# Patient Record
Sex: MALE | Race: BLACK OR AFRICAN AMERICAN | Employment: FULL TIME | URBAN - METROPOLITAN AREA
[De-identification: names, ages, dates, MRNs, and addresses within clinical notes are randomized per-mention and may not be internally consistent; named-entity substitution may affect disease eponyms.]

---

## 2023-11-30 NOTE — PERIOP NOTE
Requested recent bloodwork,  EKG graph, and last office note from the patient's PCP office. Notified patient that he needs to come in for the MRSA culture. Pt stated he would come in tomorrow.

## 2023-12-01 ENCOUNTER — HOSPITAL ENCOUNTER (OUTPATIENT)
Facility: HOSPITAL | Age: 58
Discharge: HOME | End: 2023-12-04

## 2023-12-01 DIAGNOSIS — Z01.818 PRE-OP TESTING: ICD-10-CM

## 2023-12-02 LAB
BACTERIA SPEC CULT: NORMAL
BACTERIA SPEC CULT: NORMAL
SERVICE CMNT-IMP: NORMAL

## 2023-12-05 ENCOUNTER — ANESTHESIA EVENT (OUTPATIENT)
Facility: HOSPITAL | Age: 58
End: 2023-12-05
Payer: COMMERCIAL
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
## 2023-12-06 ENCOUNTER — HOSPITAL ENCOUNTER (OUTPATIENT)
Facility: HOSPITAL | Age: 58
Setting detail: OBSERVATION
Discharge: HOME | End: 2023-12-07
Attending: ORTHOPAEDIC SURGERY | Admitting: ORTHOPAEDIC SURGERY
Payer: COMMERCIAL

## 2023-12-06 ENCOUNTER — ANESTHESIA (OUTPATIENT)
Facility: HOSPITAL | Age: 58
End: 2023-12-06
Payer: COMMERCIAL

## 2023-12-06 ENCOUNTER — APPOINTMENT (OUTPATIENT)
Facility: HOSPITAL | Age: 58
End: 2023-12-06
Attending: ORTHOPAEDIC SURGERY
Payer: COMMERCIAL

## 2023-12-06 DIAGNOSIS — M48.02 CERVICAL STENOSIS OF SPINAL CANAL: ICD-10-CM

## 2023-12-06 DIAGNOSIS — Z01.818 PRE-OP TESTING: Primary | ICD-10-CM

## 2023-12-06 LAB
ABO + RH BLD: NORMAL
BLOOD GROUP ANTIBODIES SERPL: NORMAL
SPECIMEN EXP DATE BLD: NORMAL

## 2023-12-06 PROCEDURE — C1713 ANCHOR/SCREW BN/BN,TIS/BN: HCPCS | Performed by: ORTHOPAEDIC SURGERY

## 2023-12-06 PROCEDURE — 36415 COLL VENOUS BLD VENIPUNCTURE: CPT

## 2023-12-06 PROCEDURE — 6360000002 HC RX W HCPCS: Performed by: ORTHOPAEDIC SURGERY

## 2023-12-06 PROCEDURE — 2500000003 HC RX 250 WO HCPCS: Performed by: NURSE ANESTHETIST, CERTIFIED REGISTERED

## 2023-12-06 PROCEDURE — C1729 CATH, DRAINAGE: HCPCS | Performed by: ORTHOPAEDIC SURGERY

## 2023-12-06 PROCEDURE — 3600000002 HC SURGERY LEVEL 2 BASE: Performed by: ORTHOPAEDIC SURGERY

## 2023-12-06 PROCEDURE — 86901 BLOOD TYPING SEROLOGIC RH(D): CPT

## 2023-12-06 PROCEDURE — 3700000001 HC ADD 15 MINUTES (ANESTHESIA): Performed by: ORTHOPAEDIC SURGERY

## 2023-12-06 PROCEDURE — A4217 STERILE WATER/SALINE, 500 ML: HCPCS | Performed by: ORTHOPAEDIC SURGERY

## 2023-12-06 PROCEDURE — 6370000000 HC RX 637 (ALT 250 FOR IP): Performed by: ORTHOPAEDIC SURGERY

## 2023-12-06 PROCEDURE — 7100000000 HC PACU RECOVERY - FIRST 15 MIN: Performed by: ORTHOPAEDIC SURGERY

## 2023-12-06 PROCEDURE — 6360000002 HC RX W HCPCS: Performed by: SPECIALIST

## 2023-12-06 PROCEDURE — 2580000003 HC RX 258: Performed by: ORTHOPAEDIC SURGERY

## 2023-12-06 PROCEDURE — 2709999900 HC NON-CHARGEABLE SUPPLY: Performed by: ORTHOPAEDIC SURGERY

## 2023-12-06 PROCEDURE — 7100000001 HC PACU RECOVERY - ADDTL 15 MIN: Performed by: ORTHOPAEDIC SURGERY

## 2023-12-06 PROCEDURE — 86900 BLOOD TYPING SEROLOGIC ABO: CPT

## 2023-12-06 PROCEDURE — G0378 HOSPITAL OBSERVATION PER HR: HCPCS

## 2023-12-06 PROCEDURE — 2500000003 HC RX 250 WO HCPCS: Performed by: ORTHOPAEDIC SURGERY

## 2023-12-06 PROCEDURE — L0120 CERV FLEX N/ADJ FOAM PRE OTS: HCPCS | Performed by: ORTHOPAEDIC SURGERY

## 2023-12-06 PROCEDURE — C1889 IMPLANT/INSERT DEVICE, NOC: HCPCS | Performed by: ORTHOPAEDIC SURGERY

## 2023-12-06 PROCEDURE — 86850 RBC ANTIBODY SCREEN: CPT

## 2023-12-06 PROCEDURE — 3700000000 HC ANESTHESIA ATTENDED CARE: Performed by: ORTHOPAEDIC SURGERY

## 2023-12-06 PROCEDURE — 77002 NEEDLE LOCALIZATION BY XRAY: CPT

## 2023-12-06 PROCEDURE — 3600000012 HC SURGERY LEVEL 2 ADDTL 15MIN: Performed by: ORTHOPAEDIC SURGERY

## 2023-12-06 PROCEDURE — 6360000002 HC RX W HCPCS: Performed by: NURSE ANESTHETIST, CERTIFIED REGISTERED

## 2023-12-06 PROCEDURE — 2720000010 HC SURG SUPPLY STERILE: Performed by: ORTHOPAEDIC SURGERY

## 2023-12-06 DEVICE — IMPLANTABLE DEVICE: Type: IMPLANTABLE DEVICE | Site: SPINE CERVICAL | Status: FUNCTIONAL

## 2023-12-06 DEVICE — SCREW SPNL SELF-STARTING 4X16 MM VA NS OZARK: Type: IMPLANTABLE DEVICE | Site: SPINE CERVICAL | Status: FUNCTIONAL

## 2023-12-06 RX ORDER — ONDANSETRON 4 MG/1
4 TABLET, ORALLY DISINTEGRATING ORAL EVERY 8 HOURS PRN
Status: DISCONTINUED | OUTPATIENT
Start: 2023-12-06 | End: 2023-12-07 | Stop reason: HOSPADM

## 2023-12-06 RX ORDER — FENTANYL CITRATE 50 UG/ML
INJECTION, SOLUTION INTRAMUSCULAR; INTRAVENOUS PRN
Status: DISCONTINUED | OUTPATIENT
Start: 2023-12-06 | End: 2023-12-06 | Stop reason: SDUPTHER

## 2023-12-06 RX ORDER — HYDROMORPHONE HYDROCHLORIDE 1 MG/ML
0.25 INJECTION, SOLUTION INTRAMUSCULAR; INTRAVENOUS; SUBCUTANEOUS EVERY 5 MIN PRN
Status: DISCONTINUED | OUTPATIENT
Start: 2023-12-06 | End: 2023-12-06 | Stop reason: HOSPADM

## 2023-12-06 RX ORDER — VANCOMYCIN HYDROCHLORIDE 1 G/20ML
INJECTION, POWDER, LYOPHILIZED, FOR SOLUTION INTRAVENOUS PRN
Status: DISCONTINUED | OUTPATIENT
Start: 2023-12-06 | End: 2023-12-06 | Stop reason: ALTCHOICE

## 2023-12-06 RX ORDER — FENTANYL CITRATE 50 UG/ML
25 INJECTION, SOLUTION INTRAMUSCULAR; INTRAVENOUS EVERY 5 MIN PRN
Status: DISCONTINUED | OUTPATIENT
Start: 2023-12-06 | End: 2023-12-06 | Stop reason: HOSPADM

## 2023-12-06 RX ORDER — FAMOTIDINE 20 MG/1
20 TABLET, FILM COATED ORAL 2 TIMES DAILY
Status: DISCONTINUED | OUTPATIENT
Start: 2023-12-06 | End: 2023-12-07 | Stop reason: HOSPADM

## 2023-12-06 RX ORDER — LABETALOL HYDROCHLORIDE 5 MG/ML
10 INJECTION, SOLUTION INTRAVENOUS
Status: DISCONTINUED | OUTPATIENT
Start: 2023-12-06 | End: 2023-12-06 | Stop reason: HOSPADM

## 2023-12-06 RX ORDER — DIPHENHYDRAMINE HYDROCHLORIDE 50 MG/ML
12.5 INJECTION INTRAMUSCULAR; INTRAVENOUS
Status: DISCONTINUED | OUTPATIENT
Start: 2023-12-06 | End: 2023-12-06 | Stop reason: HOSPADM

## 2023-12-06 RX ORDER — SODIUM CHLORIDE 0.9 % (FLUSH) 0.9 %
5-40 SYRINGE (ML) INJECTION PRN
Status: DISCONTINUED | OUTPATIENT
Start: 2023-12-06 | End: 2023-12-06 | Stop reason: HOSPADM

## 2023-12-06 RX ORDER — DULOXETIN HYDROCHLORIDE 30 MG/1
30 CAPSULE, DELAYED RELEASE ORAL EVERY EVENING
Status: DISCONTINUED | OUTPATIENT
Start: 2023-12-06 | End: 2023-12-07 | Stop reason: HOSPADM

## 2023-12-06 RX ORDER — MIDAZOLAM HYDROCHLORIDE 1 MG/ML
INJECTION INTRAMUSCULAR; INTRAVENOUS PRN
Status: DISCONTINUED | OUTPATIENT
Start: 2023-12-06 | End: 2023-12-06 | Stop reason: SDUPTHER

## 2023-12-06 RX ORDER — SODIUM CHLORIDE 0.9 % (FLUSH) 0.9 %
5-40 SYRINGE (ML) INJECTION EVERY 12 HOURS SCHEDULED
Status: DISCONTINUED | OUTPATIENT
Start: 2023-12-06 | End: 2023-12-06 | Stop reason: HOSPADM

## 2023-12-06 RX ORDER — PROPOFOL 10 MG/ML
INJECTION, EMULSION INTRAVENOUS PRN
Status: DISCONTINUED | OUTPATIENT
Start: 2023-12-06 | End: 2023-12-06 | Stop reason: SDUPTHER

## 2023-12-06 RX ORDER — ACETAMINOPHEN 325 MG/1
650 TABLET ORAL EVERY 6 HOURS
Status: DISCONTINUED | OUTPATIENT
Start: 2023-12-06 | End: 2023-12-07 | Stop reason: HOSPADM

## 2023-12-06 RX ORDER — SODIUM CHLORIDE 0.9 % (FLUSH) 0.9 %
5-40 SYRINGE (ML) INJECTION EVERY 12 HOURS SCHEDULED
Status: DISCONTINUED | OUTPATIENT
Start: 2023-12-06 | End: 2023-12-07 | Stop reason: HOSPADM

## 2023-12-06 RX ORDER — LIDOCAINE HCL/PF 100 MG/5ML
SYRINGE (ML) INJECTION PRN
Status: DISCONTINUED | OUTPATIENT
Start: 2023-12-06 | End: 2023-12-06 | Stop reason: SDUPTHER

## 2023-12-06 RX ORDER — METAXALONE 800 MG/1
800 TABLET ORAL EVERY 8 HOURS PRN
Status: DISCONTINUED | OUTPATIENT
Start: 2023-12-06 | End: 2023-12-07 | Stop reason: HOSPADM

## 2023-12-06 RX ORDER — SODIUM CHLORIDE, SODIUM LACTATE, POTASSIUM CHLORIDE, CALCIUM CHLORIDE 600; 310; 30; 20 MG/100ML; MG/100ML; MG/100ML; MG/100ML
INJECTION, SOLUTION INTRAVENOUS CONTINUOUS
Status: DISCONTINUED | OUTPATIENT
Start: 2023-12-06 | End: 2023-12-07 | Stop reason: HOSPADM

## 2023-12-06 RX ORDER — HYDROMORPHONE HYDROCHLORIDE 1 MG/ML
0.5 INJECTION, SOLUTION INTRAMUSCULAR; INTRAVENOUS; SUBCUTANEOUS
Status: DISCONTINUED | OUTPATIENT
Start: 2023-12-06 | End: 2023-12-07 | Stop reason: HOSPADM

## 2023-12-06 RX ORDER — SODIUM CHLORIDE 450 MG/100ML
INJECTION, SOLUTION INTRAVENOUS CONTINUOUS
Status: DISCONTINUED | OUTPATIENT
Start: 2023-12-06 | End: 2023-12-07 | Stop reason: HOSPADM

## 2023-12-06 RX ORDER — AMLODIPINE BESYLATE 5 MG/1
5 TABLET ORAL DAILY
Status: DISCONTINUED | OUTPATIENT
Start: 2023-12-06 | End: 2023-12-07 | Stop reason: HOSPADM

## 2023-12-06 RX ORDER — ATORVASTATIN CALCIUM 10 MG/1
10 TABLET, FILM COATED ORAL NIGHTLY
Status: DISCONTINUED | OUTPATIENT
Start: 2023-12-06 | End: 2023-12-07 | Stop reason: HOSPADM

## 2023-12-06 RX ORDER — SODIUM CHLORIDE 9 MG/ML
INJECTION, SOLUTION INTRAVENOUS PRN
Status: DISCONTINUED | OUTPATIENT
Start: 2023-12-06 | End: 2023-12-06 | Stop reason: HOSPADM

## 2023-12-06 RX ORDER — POLYETHYLENE GLYCOL 3350 17 G/17G
17 POWDER, FOR SOLUTION ORAL DAILY
Status: DISCONTINUED | OUTPATIENT
Start: 2023-12-06 | End: 2023-12-07 | Stop reason: HOSPADM

## 2023-12-06 RX ORDER — HYDROMORPHONE HYDROCHLORIDE 2 MG/1
2 TABLET ORAL EVERY 4 HOURS PRN
Status: DISCONTINUED | OUTPATIENT
Start: 2023-12-06 | End: 2023-12-07 | Stop reason: HOSPADM

## 2023-12-06 RX ORDER — GLYCOPYRROLATE 0.2 MG/ML
INJECTION INTRAMUSCULAR; INTRAVENOUS PRN
Status: DISCONTINUED | OUTPATIENT
Start: 2023-12-06 | End: 2023-12-06 | Stop reason: SDUPTHER

## 2023-12-06 RX ORDER — ONDANSETRON 2 MG/ML
INJECTION INTRAMUSCULAR; INTRAVENOUS PRN
Status: DISCONTINUED | OUTPATIENT
Start: 2023-12-06 | End: 2023-12-06 | Stop reason: SDUPTHER

## 2023-12-06 RX ORDER — ONDANSETRON 2 MG/ML
4 INJECTION INTRAMUSCULAR; INTRAVENOUS
Status: DISCONTINUED | OUTPATIENT
Start: 2023-12-06 | End: 2023-12-06 | Stop reason: HOSPADM

## 2023-12-06 RX ORDER — INDOCYANINE GREEN AND WATER 25 MG
KIT INJECTION PRN
Status: DISCONTINUED | OUTPATIENT
Start: 2023-12-06 | End: 2023-12-06 | Stop reason: ALTCHOICE

## 2023-12-06 RX ORDER — ROCURONIUM BROMIDE 10 MG/ML
INJECTION, SOLUTION INTRAVENOUS PRN
Status: DISCONTINUED | OUTPATIENT
Start: 2023-12-06 | End: 2023-12-06 | Stop reason: SDUPTHER

## 2023-12-06 RX ORDER — BISACODYL 5 MG/1
5 TABLET, DELAYED RELEASE ORAL DAILY
Status: DISCONTINUED | OUTPATIENT
Start: 2023-12-06 | End: 2023-12-07 | Stop reason: HOSPADM

## 2023-12-06 RX ORDER — HYDROMORPHONE HYDROCHLORIDE 1 MG/ML
0.25 INJECTION, SOLUTION INTRAMUSCULAR; INTRAVENOUS; SUBCUTANEOUS
Status: DISCONTINUED | OUTPATIENT
Start: 2023-12-06 | End: 2023-12-07 | Stop reason: HOSPADM

## 2023-12-06 RX ORDER — HYDROMORPHONE HYDROCHLORIDE 1 MG/ML
INJECTION, SOLUTION INTRAMUSCULAR; INTRAVENOUS; SUBCUTANEOUS PRN
Status: DISCONTINUED | OUTPATIENT
Start: 2023-12-06 | End: 2023-12-06 | Stop reason: SDUPTHER

## 2023-12-06 RX ORDER — DIPHENHYDRAMINE HCL 25 MG
25 CAPSULE ORAL EVERY 6 HOURS PRN
Status: DISCONTINUED | OUTPATIENT
Start: 2023-12-06 | End: 2023-12-07 | Stop reason: HOSPADM

## 2023-12-06 RX ORDER — DIPHENHYDRAMINE HYDROCHLORIDE 50 MG/ML
25 INJECTION INTRAMUSCULAR; INTRAVENOUS EVERY 6 HOURS PRN
Status: DISCONTINUED | OUTPATIENT
Start: 2023-12-06 | End: 2023-12-07 | Stop reason: HOSPADM

## 2023-12-06 RX ORDER — ONDANSETRON 2 MG/ML
4 INJECTION INTRAMUSCULAR; INTRAVENOUS EVERY 6 HOURS PRN
Status: DISCONTINUED | OUTPATIENT
Start: 2023-12-06 | End: 2023-12-07 | Stop reason: HOSPADM

## 2023-12-06 RX ORDER — HYDROMORPHONE HYDROCHLORIDE 4 MG/1
4 TABLET ORAL EVERY 4 HOURS PRN
Status: DISCONTINUED | OUTPATIENT
Start: 2023-12-06 | End: 2023-12-07 | Stop reason: HOSPADM

## 2023-12-06 RX ORDER — DROPERIDOL 2.5 MG/ML
0.62 INJECTION, SOLUTION INTRAMUSCULAR; INTRAVENOUS
Status: DISCONTINUED | OUTPATIENT
Start: 2023-12-06 | End: 2023-12-06 | Stop reason: HOSPADM

## 2023-12-06 RX ORDER — FENTANYL CITRATE 50 UG/ML
25 INJECTION, SOLUTION INTRAMUSCULAR; INTRAVENOUS
Status: COMPLETED | OUTPATIENT
Start: 2023-12-06 | End: 2023-12-06

## 2023-12-06 RX ADMIN — FAMOTIDINE 20 MG: 20 TABLET, FILM COATED ORAL at 19:51

## 2023-12-06 RX ADMIN — HYDROMORPHONE HYDROCHLORIDE 1 MG: 1 INJECTION, SOLUTION INTRAMUSCULAR; INTRAVENOUS; SUBCUTANEOUS at 15:52

## 2023-12-06 RX ADMIN — WATER 2000 MG: 1 INJECTION INTRAMUSCULAR; INTRAVENOUS; SUBCUTANEOUS at 15:40

## 2023-12-06 RX ADMIN — ROCURONIUM BROMIDE 10 MG: 10 INJECTION, SOLUTION INTRAVENOUS at 16:33

## 2023-12-06 RX ADMIN — SODIUM CHLORIDE, SODIUM LACTATE, POTASSIUM CHLORIDE, AND CALCIUM CHLORIDE: 600; 310; 30; 20 INJECTION, SOLUTION INTRAVENOUS at 15:27

## 2023-12-06 RX ADMIN — PROPOFOL 150 MG: 10 INJECTION, EMULSION INTRAVENOUS at 15:28

## 2023-12-06 RX ADMIN — MIDAZOLAM 2 MG: 1 INJECTION INTRAMUSCULAR; INTRAVENOUS at 15:21

## 2023-12-06 RX ADMIN — Medication 60 MG: at 15:27

## 2023-12-06 RX ADMIN — BISACODYL 5 MG: 5 TABLET, COATED ORAL at 20:44

## 2023-12-06 RX ADMIN — SUGAMMADEX 200 MG: 100 INJECTION, SOLUTION INTRAVENOUS at 17:55

## 2023-12-06 RX ADMIN — FENTANYL CITRATE 100 MCG: 50 INJECTION, SOLUTION INTRAMUSCULAR; INTRAVENOUS at 15:27

## 2023-12-06 RX ADMIN — DULOXETINE HYDROCHLORIDE 30 MG: 30 CAPSULE, DELAYED RELEASE ORAL at 20:14

## 2023-12-06 RX ADMIN — SODIUM CHLORIDE: 4.5 INJECTION, SOLUTION INTRAVENOUS at 19:45

## 2023-12-06 RX ADMIN — ATORVASTATIN CALCIUM 10 MG: 10 TABLET, FILM COATED ORAL at 19:51

## 2023-12-06 RX ADMIN — ONDANSETRON 4 MG: 2 INJECTION INTRAMUSCULAR; INTRAVENOUS at 15:23

## 2023-12-06 RX ADMIN — ROCURONIUM BROMIDE 50 MG: 10 INJECTION, SOLUTION INTRAVENOUS at 15:29

## 2023-12-06 RX ADMIN — FENTANYL CITRATE 25 MCG: 50 INJECTION INTRAMUSCULAR; INTRAVENOUS at 13:53

## 2023-12-06 RX ADMIN — ROCURONIUM BROMIDE 10 MG: 10 INJECTION, SOLUTION INTRAVENOUS at 17:14

## 2023-12-06 RX ADMIN — HYDROMORPHONE HYDROCHLORIDE 0.5 MG: 1 INJECTION, SOLUTION INTRAMUSCULAR; INTRAVENOUS; SUBCUTANEOUS at 19:50

## 2023-12-06 RX ADMIN — AMLODIPINE BESYLATE 5 MG: 5 TABLET ORAL at 20:44

## 2023-12-06 RX ADMIN — FENTANYL CITRATE 25 MCG: 50 INJECTION INTRAMUSCULAR; INTRAVENOUS at 13:28

## 2023-12-06 RX ADMIN — GLYCOPYRROLATE 0.2 MG: 0.2 INJECTION INTRAMUSCULAR; INTRAVENOUS at 15:23

## 2023-12-06 RX ADMIN — ACETAMINOPHEN 650 MG: 325 TABLET ORAL at 20:14

## 2023-12-06 RX ADMIN — SODIUM CHLORIDE, SODIUM LACTATE, POTASSIUM CHLORIDE, AND CALCIUM CHLORIDE: 600; 310; 30; 20 INJECTION, SOLUTION INTRAVENOUS at 09:11

## 2023-12-06 RX ADMIN — FENTANYL CITRATE 25 MCG: 50 INJECTION INTRAMUSCULAR; INTRAVENOUS at 13:41

## 2023-12-06 RX ADMIN — FENTANYL CITRATE 25 MCG: 50 INJECTION INTRAMUSCULAR; INTRAVENOUS at 14:08

## 2023-12-06 ASSESSMENT — PAIN DESCRIPTION - DESCRIPTORS
DESCRIPTORS: ACHING
DESCRIPTORS: ACHING
DESCRIPTORS: ACHING;BURNING
DESCRIPTORS: ACHING

## 2023-12-06 ASSESSMENT — PAIN SCALES - GENERAL
PAINLEVEL_OUTOF10: 0
PAINLEVEL_OUTOF10: 8
PAINLEVEL_OUTOF10: 10
PAINLEVEL_OUTOF10: 0
PAINLEVEL_OUTOF10: 0
PAINLEVEL_OUTOF10: 10
PAINLEVEL_OUTOF10: 10

## 2023-12-06 ASSESSMENT — PAIN DESCRIPTION - LOCATION
LOCATION: NECK
LOCATION: GENERALIZED
LOCATION: NECK

## 2023-12-06 ASSESSMENT — PAIN DESCRIPTION - ORIENTATION
ORIENTATION: ANTERIOR

## 2023-12-06 ASSESSMENT — PAIN - FUNCTIONAL ASSESSMENT
PAIN_FUNCTIONAL_ASSESSMENT: 0-10
PAIN_FUNCTIONAL_ASSESSMENT: 0-10

## 2023-12-06 ASSESSMENT — PAIN DESCRIPTION - FREQUENCY: FREQUENCY: CONTINUOUS

## 2023-12-06 ASSESSMENT — PAIN DESCRIPTION - PAIN TYPE: TYPE: SURGICAL PAIN

## 2023-12-06 NOTE — ANESTHESIA POSTPROCEDURE EVALUATION
Department of Anesthesiology  Postprocedure Note    Patient: July Mak  MRN: [id number]  YOB: 1965  Date of evaluation: 12/6/2023      Procedure Summary       Date: 12/06/23 Room / Location: THE [facility] 08 / [facility] MAIN OR    Anesthesia Start: 6150 Anesthesia Stop: 3386    Procedure: ANTERIOR CERVICAL DECOMPRESSION FUSION CERVICAL THREE/FOUR, CERVICAL FOUR/FIVE, CERVICAL FIVE/SIX, CERVICAL SIX/SEVEN WITH C -ARM OP 23 (Spine Cervical) Diagnosis:       Cervical spinal stenosis      Radiculopathy, cervical      (Cervical spinal stenosis [M48.02])      (Radiculopathy, cervical [M54.12])    Surgeons: Jamari Lopez MD Responsible Provider: Frances Daniel MD    Anesthesia Type: general ASA Status: 2            Anesthesia Type: No value filed.     Chelo Phase I: Chelo Score: 9    Chelo Phase II:        Anesthesia Post Evaluation    Patient location during evaluation: PACU  Patient participation: complete - patient participated  Level of consciousness: awake and alert  Pain score: 0  Airway patency: patent  Nausea & Vomiting: no vomiting  Complications: no  Cardiovascular status: blood pressure returned to baseline  Respiratory status: acceptable  Hydration status: euvolemic  Pain management: adequate

## 2023-12-06 NOTE — PERIOP NOTE
[facility] made aware that SBAR is ready for review. Patient assigned to Imer Doshi RN in room 202.

## 2023-12-06 NOTE — PERIOP NOTE
Reviewed PTA medication list with patient/caregiver and patient/caregiver denies any additional medications. Patient admits to having a responsible adult care for them at home for at least 24 hours after surgery. Patient encouraged to use gown warming system and informed that using said warming gown to regulate body temperature prior to a procedure has been shown to help reduce the risks of blood clots and infection. Patient's pharmacy of choice verified and documented in PTA medication section. Dual skin assessment & fall risk band verification completed with A Kehinde RIVERA.

## 2023-12-06 NOTE — BRIEF OP NOTE
Brief Postoperative Note      Patient: Richy Otoole  YOB: 1965  MRN: [id number]    Date of Procedure: 12/6/2023    Pre-Op Diagnosis Codes:     * Cervical spinal stenosis [M48.02]     * Radiculopathy, cervical [M54.12]    Post-Op Diagnosis: Same       Procedure(s):  ANTERIOR CERVICAL DECOMPRESSION FUSION CERVICAL THREE/FOUR, CERVICAL FOUR/FIVE, CERVICAL FIVE/SIX, CERVICAL SIX/SEVEN WITH C -ARM OP 23    Surgeon(s):  Zachery Mcleod MD    Assistant:  Surgical Assistant: Ac Hernandez  Physician Assistant: Simi Cruz PA-C    Anesthesia: General    Estimated Blood Loss (mL): Minimal    Complications: None    Specimens:   * No specimens in log *    Implants:  Implant Name Type Inv. Item Serial No.  Lot No. LRB No. Used Action   ALLOGRAFT BNE SPACER 7 DEG 14.5X11.5X7 MM CERV CORTICAL CANC - [id number]  ALLOGRAFT BNE SPACER 7 DEG 14.5X11.5X7 MM CERV CORTICAL CANC [id number] [employer] Gradalis  N/A 1 Implanted   [id number] - [id number]   [id number] [employer]  N/A 1 Implanted   [id number] - [id number]   [id number] [employer]  N/A 1 Implanted   [id number] - [id number]   [id number] [employer]  N/A 1 Implanted   PLATE CERV OZARK 88SB 4 LEVEL - [id number]  PLATE CERV OZARK 48IA 4 LEVEL  [employer] Gradalis [id number] N/A 1 Implanted   SCREW SPNL SELF-STARTING 4X16 MM VA NS OZARK - [id number]  SCREW SPNL SELF-STARTING 4X16 MM VA NS ZymergenARK  [employer] Gradalis [id number] N/A 10 Implanted         Drains:   Closed/Suction Drain Right; Anterior Neck Bulb (Active)   Site Description Clean, dry & intact 12/06/23 1718   Dressing Status Clean, dry & intact 12/06/23 1718   Drainage Appearance Serosanguinous 12/06/23 1718   Drain Status Compressed; To bulb suction 12/06/23 1718       Findings: stenosis in addition to calcification, ossification of ligamentous structures, stiff spine      Electronically signed by Zachery Mcleod MD on 12/6/2023 at 5:34 PM

## 2023-12-06 NOTE — INTERVAL H&P NOTE
Update History & Physical    The patient's History and Physical of December 5, 2023 was reviewed with the patient and I examined the patient. There was no change. The surgical site was confirmed by the patient and me. Plan: The risks, benefits, expected outcome, and alternative to the recommended procedure have been discussed with the patient. Patient understands and wants to proceed with the procedure.      Electronically signed by Jose Bower MD on 12/6/2023 at 8:59 AM

## 2023-12-07 VITALS
OXYGEN SATURATION: 100 % | RESPIRATION RATE: 18 BRPM | SYSTOLIC BLOOD PRESSURE: 159 MMHG | DIASTOLIC BLOOD PRESSURE: 92 MMHG | WEIGHT: 155.7 LBS | TEMPERATURE: 98.6 F | HEART RATE: 77 BPM | BODY MASS INDEX: 23.06 KG/M2 | HEIGHT: 69 IN

## 2023-12-07 PROCEDURE — 6360000002 HC RX W HCPCS: Performed by: ORTHOPAEDIC SURGERY

## 2023-12-07 PROCEDURE — 97165 OT EVAL LOW COMPLEX 30 MIN: CPT

## 2023-12-07 PROCEDURE — G0378 HOSPITAL OBSERVATION PER HR: HCPCS

## 2023-12-07 PROCEDURE — 6370000000 HC RX 637 (ALT 250 FOR IP): Performed by: ORTHOPAEDIC SURGERY

## 2023-12-07 PROCEDURE — 96374 THER/PROPH/DIAG INJ IV PUSH: CPT

## 2023-12-07 PROCEDURE — 97161 PT EVAL LOW COMPLEX 20 MIN: CPT

## 2023-12-07 PROCEDURE — 97116 GAIT TRAINING THERAPY: CPT

## 2023-12-07 PROCEDURE — 2580000003 HC RX 258: Performed by: ORTHOPAEDIC SURGERY

## 2023-12-07 RX ORDER — HYDROMORPHONE HYDROCHLORIDE 2 MG/1
2 TABLET ORAL EVERY 6 HOURS PRN
Qty: 28 TABLET | Refills: 0 | Status: SHIPPED | OUTPATIENT
Start: 2023-12-07 | End: 2023-12-14

## 2023-12-07 RX ORDER — ONDANSETRON 4 MG/1
4 TABLET, ORALLY DISINTEGRATING ORAL EVERY 8 HOURS PRN
Qty: 20 TABLET | Refills: 0 | Status: SHIPPED | OUTPATIENT
Start: 2023-12-07

## 2023-12-07 RX ADMIN — WATER 2000 MG: 1 INJECTION INTRAMUSCULAR; INTRAVENOUS; SUBCUTANEOUS at 00:36

## 2023-12-07 RX ADMIN — ONDANSETRON 4 MG: 2 INJECTION INTRAMUSCULAR; INTRAVENOUS at 06:53

## 2023-12-07 RX ADMIN — WATER 2000 MG: 1 INJECTION INTRAMUSCULAR; INTRAVENOUS; SUBCUTANEOUS at 08:10

## 2023-12-07 RX ADMIN — POLYETHYLENE GLYCOL 3350 17 G: 17 POWDER, FOR SOLUTION ORAL at 08:10

## 2023-12-07 RX ADMIN — FAMOTIDINE 20 MG: 20 TABLET, FILM COATED ORAL at 08:10

## 2023-12-07 RX ADMIN — ACETAMINOPHEN 650 MG: 325 TABLET ORAL at 08:22

## 2023-12-07 RX ADMIN — BISACODYL 5 MG: 5 TABLET, COATED ORAL at 08:10

## 2023-12-07 RX ADMIN — AMLODIPINE BESYLATE 5 MG: 5 TABLET ORAL at 08:10

## 2023-12-07 ASSESSMENT — PAIN DESCRIPTION - DESCRIPTORS
DESCRIPTORS: ACHING
DESCRIPTORS: ACHING

## 2023-12-07 ASSESSMENT — PAIN DESCRIPTION - ORIENTATION
ORIENTATION: ANTERIOR
ORIENTATION: ANTERIOR

## 2023-12-07 ASSESSMENT — PAIN DESCRIPTION - LOCATION
LOCATION: NECK
LOCATION: NECK

## 2023-12-07 ASSESSMENT — PAIN SCALES - GENERAL
PAINLEVEL_OUTOF10: 6
PAINLEVEL_OUTOF10: 10

## 2023-12-07 NOTE — PROGRESS NOTES
Physical Therapy Goals:  Initiated 12/7/2023 In 1 day pt will be able to:  Demonstrate proper log rolling technique for safe OOB activities. Supine<>sidelying<>sit w/ S w/ HR for meals  Sit<>stand mod I w/ in prep for ambulation. Gait >100' w/ cervical collar and S for home/community ambulation. Ascend/descend >3 steps SBA w/ HR for home entry. Tolerate sitting up in chair 30 minutes for ADL's. Patient/family educaiton to be I w/ safe techniques for safe discharge. [x]  Patient has met MD tory nicholson for d/c home   [x]  Recommend HH with 24 hour adult care   []  Benefit from additional acute PT session to address:      PHYSICAL THERAPY EVALUATION    Patient: Genna Jeffery (79 y.o. male)  Date: 12/7/2023  Primary Diagnosis: Cervical spinal stenosis [M48.02]  Radiculopathy, cervical [M54.12]  Cervical stenosis of spinal canal [M48.02]  Procedure(s) (LRB):  ANTERIOR CERVICAL DECOMPRESSION FUSION CERVICAL THREE/FOUR, CERVICAL FOUR/FIVE, CERVICAL FIVE/SIX, CERVICAL SIX/SEVEN WITH C -ARM OP 23 (N/A) 1 Day Post-Op   Precautions: Fall Risk,  ,  ,  ,  , Spinal Precautions: No Bending, No Lifting, No Twisting (cervical),  ,      ASSESSMENT :  Based on the objective data described below, the patient presents w/ mildly decreased mobility in regards to bed mobility, transfers, gait quality and tolerance, balance, stair negotiation and safety due to cervical surgery. Generalized dec strength, pain in cervical region,dec sensation of L fingers also impacting pt functional mobility. Pt rating pain using numerical pain scale pre/during/post session 6/10. Pt ed regarding mobility safety, back precautions, log roll technique, soft cervical collar use, ice application/use, environmental safety and home safe techniques. Pt supine sleeping in bed upon arrival.  Pt able to perform supine<>sidelying<>sit<>stand w/ S/mod I. Safety vc required throughout session to reinforce safety.   Pt able to participate in gait Mobility:  Bed Mobility Training  Bed Mobility Training: Yes  Interventions: Safety awareness training;Verbal cues  Rolling: Supervision;Modified independent  Supine to Sit: Supervision  Sit to Supine: Supervision;Modified independent  Scooting: Modified independent  Transfers:  Transfer Training  Transfer Training: Yes  Interventions: Safety awareness training;Verbal cues  Sit to Stand: Modified independent  Stand to Sit: Modified independent  Balance:   Balance  Sitting: Intact  Standing: Intact  Wheelchair Mobility:  Ambulation/Gait Training:  Gait  Overall Level of Assistance: Stand-by assistance  Distance (ft): 150 Feet  Assistive Device: Gait belt  Interventions: Safety awareness training  Base of Support: Narrowed  Speed/Yenni: Pace decreased (< 100 feet/min)  Gait Abnormalities: Altered arm swing  Rail Use: Both  Stairs - Level of Assistance: Supervision  Number of Stairs Trained: 5  Therapeutic Exercises/Neuromuscular Re-education:     Pain:  Pain level pre-treatment: 6/10   Pain level post-treatment: 6/10   Pain Intervention(s): Medication (see MAR); Rest, Ice, Repositioning  Response to intervention: Nurse notified     Activity Tolerance:   Activity Tolerance: Patient tolerated evaluation without incident  Please refer to the flowsheet for vital signs taken during this treatment. After treatment:   []         Patient left in no apparent distress sitting up in chair  [x]         Patient left in no apparent distress in bed  [x]         Call bell left within reach  [x]         Nursing notified  []         Caregiver present  []         Bed alarm activated  []         SCDs applied    COMMUNICATION/EDUCATION:   Patient Education  Education Given To: Patient  Education Provided: Role of Therapy;Transfer Training; Fall Prevention Strategies;Precautions  Education Method: Demonstration;Verbal;Teach Back  Barriers to Learning: None  Education Outcome: Verbalized understanding;Demonstrated understanding    Thank

## 2023-12-07 NOTE — DISCHARGE SUMMARY
[unfilled]    Discharge/Transfer  Summary     Patient: Nghia Oneal MRN: [id number]  SSN: [id number]    YOB: 1965  Age: 63 y.o. Sex: male       Admit Date: 12/6/2023    Discharge Date: 12/7/2023      Admission Diagnoses: Cervical spinal stenosis [M48.02]  Radiculopathy, cervical [M54.12]  Cervical stenosis of spinal canal [M48.02]    Discharge Diagnoses:  Cervical Spinal Stenosis    Discharge Condition: Good    Hospital Course: Benign      Disposition: home    Discharge Medications:   Current Discharge Medication List        START taking these medications    Details   HYDROmorphone (DILAUDID) 2 MG tablet Take 1 tablet by mouth every 6 hours as needed for Pain for up to 7 days. Max Daily Amount: 8 mg  Qty: 28 tablet, Refills: 0    Comments: Reduce doses taken as pain becomes manageable  Associated Diagnoses: Cervical stenosis of spinal canal      ondansetron (ZOFRAN-ODT) 4 MG disintegrating tablet Take 1 tablet by mouth every 8 hours as needed for Nausea or Vomiting  Qty: 20 tablet, Refills: 0           CONTINUE these medications which have NOT CHANGED    Details   amLODIPine (NORVASC) 5 MG tablet Take 1 tablet by mouth daily Indications: HTN      atorvastatin (LIPITOR) 10 MG tablet Take 1 tablet by mouth at bedtime Indications: high cholesterol      loratadine (CLARITIN) 10 MG tablet Take 1 tablet by mouth as needed      DULoxetine (CYMBALTA) 30 MG extended release capsule Take 1 capsule by mouth every evening           STOP taking these medications       ibuprofen (ADVIL;MOTRIN) 800 MG tablet Comments:   Reason for Stopping:         lidocaine (LIDODERM) 5 % Comments:   Reason for Stopping:         meloxicam (MOBIC) 15 MG tablet Comments:   Reason for Stopping:               Pt to follow-up in outpatient clinic, routine 2 wk follow up scheduled.      Signed By: KSENIA Perkins - NP     December 7, 2023

## 2023-12-07 NOTE — PROGRESS NOTES
19:00 Arrived from PACU via bed with family @ bedside. Skin assessment completed with Kartik Harris. Anterior neck dsg(honey comb) remains C/D/I with bloody drainage per VIOLETTA. CMS & RP are intact. Admission docs completed along with room orientation. 20:30 Able to take sm sips of H20 w/o difficulty. 22:55 Shift assessment completed. See nsg flow sheet for details. Anticipated need for pain meds,  pulled Dilaudid 1 mg, wasted 0.5 with Melvina Edward RN in med room. Pt refused pain meds, wasted the other 0.5 with EBONY Burrell RN in med room. Girl friend remains @ bedside in chairs. 03:00 Reassessed with 0 changes noted. Neck dsg remains C/D/I with sm amt of serosanguinous drainage per VIOLETTA. Resting quietly in bed with eyes closed between cares x for voiding per urinal w/o difficulty. 07:30 Bedside and Verbal shift change report given to [address] [place] [address] (oncoming nurse) by Cosme Law RN (offgoing nurse). Report included the following information Nurse Handoff Report.

## 2023-12-07 NOTE — CARE COORDINATION
The pt was evaluated by OT and they recommended home health. The pt does not have any PT  needs. OT is a therapy that can not stand alone for a home health service.

## 2023-12-07 NOTE — NURSE NAVIGATOR
July Mak  Rounded post cervical surgery. Discussed the following with the patient:    Activity:   Walk short distances every hour while you are awake to promote circulation and prevent pneumonina/constipation. Turn head slowly from side to side for ROM. Sleep with HOB elevated to help prevent swelling in neck. Follow neck movement per MD instruction. If you have a neck collar follow your providers instructions for how long to wear the collar. Promoting Circulation:  SCD on both legs when not walking. Ankle pumps 10 x per hour in hospital & at home. Pain Control:  Pain medications side effects of nausea, dizziness reviewed with patient. Encouraged patient to use distraction, & change position to help with pain. Swallow every several times each hour to help sore throat pain. Do not get constipated: take stool softener/mild laxative daily while on narcotics. Incentive Spirometry:   Use of incentive spirometer 10 x/hr. It is important to help prevent pneumonia. Patient Safety:   Call light & belongings in reach. Call for help when want to walk or get OOB. Diet: Following swallowing precautions  Eat for healing. Start out with Soft foods and small bites. Swallowing frequently will help with healing. Drink lots of fluids through a straw so urine is lemonade in color. Make sure to eat before taking any pain medication since they cause dizziness and nausea. Report to staff or surgeon any trouble swallowing. July Mak given the opportunity for asking questions. Belongings left in reach, call light in reach.

## 2023-12-07 NOTE — PROGRESS NOTES
Speech-Language Pathology  Orders received and chart reviewed. Attempted to see patient for clinical bedside swallow evaluation, however patient discharged and no longer in building upon [address] arrival. Per nursing, patient observed to tolerate thin liquids, regular solids, and pills without overt s/sx of aspiration.    Olivia Talavera M.S., CCC-SLP

## 2023-12-07 NOTE — PROGRESS NOTES
Vitals stable, A/Ox3, resting in bed  Elevated pain, +nausea  Surgical dressing c/d/i, VIOLETTA to suction minimal output  Neurological intact  Plan:  D/c to home today once pain controlled and clears PT/OT, D/C VIOLETTA drain prior to discharge

## 2023-12-07 NOTE — PROGRESS NOTES
SW met with patient at bedside. SW witnessed patient ambulating, walking the halls. Patient reported that his wife will transport him home upon discharge and be with him during recovery at home. Patient has no discharge needs. SW confirmed with nurse the drains will be removed prior to discharge. Patient was evaluated by PT and it was determined that patient does not have any PT or home health needs.

## 2023-12-07 NOTE — PROGRESS NOTES
0730-  Bedside shift report received from Mily Bray. Assumed care of pt at this time. 0800-  Pt A&Ox 4. IV to the L arm infusing, dressing CDI. Lung sounds clear on room air. Honeycomb dressing to the anterior neck CDI. VIOLETTA drain in place draining red fluid, site CDI. SCDs intact to BLE. Pt states some numbness present in the LUE, states present before surgery and has not worsened. Safety measures in place, call bell within reach. 1215-  5 mL red fluid emptied from VIOLETTA drain. VIOLETTA drain removed from neck, tip intact, no complications. Clear dressing applied over edge of honeycomb dressing near drain site. 1300-  Pt educated on the importance of managing pain at a tolerable level and the effects of unmanaged pain. AVS medications reviewed w/ Funmi Saldivar RN. Discharge instructions reviewed w/ pt, questions answered. IV removed, tip intact, no complications.

## 2023-12-07 NOTE — PROGRESS NOTES
Care Mgmt visited patient in room to review/discuss OBS Status, patient not feeling well, sleepy, unable to sign. Copy of letter along with my contact number [phone] left at patient's bedside. Original letter placed in patient's chart.

## 2023-12-13 NOTE — OP NOTE
[facility] [place]  OPERATIVE REPORT    Name:  Genna Jeffery  MR#:   [id number]  :  1965  ACCOUNT #:  [de-identified]  DATE OF SERVICE:  2023    PREOPERATIVE DIAGNOSIS:  Cervical spondylitic myelopathy. POSTOPERATIVE DIAGNOSIS:  Cervical spondylitic myelopathy. PROCEDURES PERFORMED:  Anterior cervical decompression and fusion C3-C4, C4-C5, C5-C6, C6-C7; structural allograft x4; anterior cervical plate fixation, C3, C4, C5, C6, C7, with Tamika anterior cervical plate and screws. SURGEON:  Syeda Caldera MD.    ASSISTANT:  LUPILLO Zendejas.    ANESTHESIA:  General endotracheal.    COMPLICATIONS:  None. SPECIMENS REMOVED:  None. IMPLANTS:  Tamika. ESTIMATED BLOOD LOSS:  Minimal.    FINDINGS:  The patient had spondylitic stenosis at each level. DESCRIPTION OF PROCEDURE:  Following induction of general endotracheal anesthesia, the patient was placed in the supine position on a cervical headholder. The patient was prepped and draped in the usual fashion. Right-sided approach was utilized. Sternocleidomastoid and great vessels were mobilized laterally. Esophagus and trachea were mobilized medially with blunt finger dissection. Prevertebral fascia was entered and the C-arm image verified our surgical level. Beginning inferiorly at C6-C7, Lapaz pins were placed in the segments above and below and Cloward self-retaining retractor blades were placed under the cover of longus colli musculature bilaterally. Annular tissue was incised and a radical diskectomy was done back to the posterior margin. Posterior marginal osteophytes were thinned with a high-speed bur and resected with a 4-0 Nicole curette and sella punch. Posterior longitudinal ligament was taken and a thorough decompression was done of the epidural space. A structural allograft was sized and tamped firmly into place with excellent bony apposition.   This procedure was then repeated sequentially at C5-C6, C4-C5, and

## 2024-03-19 ENCOUNTER — HOSPITAL ENCOUNTER (OUTPATIENT)
Facility: HOSPITAL | Age: 59
Setting detail: RECURRING SERIES
Discharge: HOME | End: 2024-03-22
Payer: MEDICAID

## 2024-03-19 PROCEDURE — 92610 EVALUATE SWALLOWING FUNCTION: CPT

## 2024-03-20 NOTE — PLAN OF CARE
[facility]  [address] [place] [address]  Ph [phone]  Fx [phone]    Plan of Care/ Statement of Necessity for Speech Therapy Services    Patient name: Nickolas Casper Start of Care: 3/19/2024   Referral source: Jadyn Garber, KSENIA -* : 1965    Medical Diagnosis: Dysphagia, unspecified [R13.10]  Payor: [facility] MEDICAID / Plan: [facility] HEALTHKEEPERS PLUS / Product Type: *No Product type* /  Onset Date:3/19/2024    Treatment Diagnosis: Dysphagia, unspecified R13.10   Prior Hospitalization: see medical history Provider#: [id number]   Medications: Verified on Patient summary List    Comorbidities: None reported   Prior Level of Function: WFL       The Plan of Care and following information is based on the information from the initial evaluation.    Assessment/ suarez information: Nickolas Casper is a 57 yo male seen at [facility] for persistent swallowing difficulties following anterior cervical surgery 2023. Pt denies difficulties swallowing prior to surgery in Dec. Endorses food becoming \"stuck\" in the laryngeal area, predominantly on the R side. Reports liquid washes are not effective at clearing the \"stuck\" food, but it eventually resolves w/ time. Reports eating a regular diet w/ thin liquids at home. Endorses a change in vocal quality following surgery, w/ hoarseness. Pt denies any throat pain w/ the hoarseness. Frequent throat clearing and occasional cough noted at rest. Reports an immediate cough when drinking water. An oral [facility] exam found strength, ROM, and coordination of the oral mechanism to be WFL. The oral cavity was moist and clean, w/ some missing dentition. Hyolaryngeal excursion was appreciated to palpation. Dx trials included thin liquid single sip/sequential via cup sip + straw, puree, and regular solids. Patient demo'd overt s/sx aspiration w/ sequential thin liquid via cup sip and straw, evidenced by throat clearing. Patient

## 2024-03-20 NOTE — PROGRESS NOTES
ST DAILY TREATMENT NOTE (2023)    Patient Name: Nickolas Casper    Date: 3/20/2024    : 1965  Insurance: Payor: [place]ICA[place] / Plan: [facility] HEALTHKEEPERS PLUS / Product Type: *No Product type* /      Patient  verified yes     Visit #   Current / Total 1 4   Time   In / Out 1030 1100   Pain   In / Out 00 0   Subjective Functional Status/Changes: \"You guys did a good job\"     TREATMENT AREA =  Dysphagia, unspecified [R13.10]    Date of Onset: 2023  Social History: Lives w/ partner  Prior Functional level: WFL  Radiology: MBS needed  Current diet: Regular solids and thin liquids  positioning: upright in chair  Mental Status:  [x]alert []lethargic []confused  Orientation: [x]person [x]place [x]time [x]situation  AC Directions: []1-step []2-step []3-step [x]complex  Motivation: [x]excellent []good  []fair  []poor   Barriers to learning: [x]none []aphasia []?cognition []lethargy/motivation [][place]   []?vision []language []Fatigue/pain []psych factors compensate with:   Dentition: [x]normal []abnormal []edentulous []dentures   Respiratory Status: [x]WFL []SOB  []O2 L/min:  []NC []Mask               []Trach Tube:                     []Excess secretions  Lips:  [x]Symmetrical []asymmetrical  Retraction [x]WFL  []?min []?mod []?max  Protrusion [x]WFL  []?min []?mod []?max  Strength [x]WFL  []?min []?mod []?max  Puff  []WFL  []?min []?mod []?max  Tongue:  [x]Symmetrical []asymmetrical  Protrusion [x]WFL  []?min []?mod []?max  Elevation [x]WFL  []?min []?mod []?max  Depression [x]WFL  []?min []?mod []?max  Lateralization [x]WFL  []?min []?mod []?max  Strength [x]WFL  []?min []?mod []?max  Velum:  []Symmetrical []asymmetrical  Gag Reflex:  []Present []absent [x]DNT  Sensation:  [x]Intact []Diminished  Specify: pharyngeal  Voice:  []Normal [x]Hoarse []harsh  []Breathy []Hypernasal []hyponasal  []Gurgly Other:   Swallow:  [x]Volitional []absent  [x]Reflexive []absent  Cough   Strength :

## 2024-04-09 ENCOUNTER — HOSPITAL ENCOUNTER (OUTPATIENT)
Facility: HOSPITAL | Age: 59
Setting detail: RECURRING SERIES
Discharge: HOME | End: 2024-04-12
Payer: MEDICAID

## 2024-04-09 PROCEDURE — 92526 ORAL FUNCTION THERAPY: CPT

## 2024-04-09 NOTE — PROGRESS NOTES
ST DAILY TREATMENT NOTE (2023)    Patient Name: Nickolas Casper    Date: 2024    : 1965  Insurance: Payor: [facility] MEDICAID / Plan: [facility] [employer] PLUS / Product Type: *No Product type* /      Patient  verified yes     Visit #   Current / Total 2 4   Time   In / Out 1030 1045   Pain   In / Out 0 0   Subjective Functional Status/Changes: \"It's getting better.\"     TREATMENT AREA =  Dysphagia, unspecified [R13.10]    OBJECTIVE  Treatment provided includes:  Increase/Improve:  []  Voice Quality []  Cognitive Linguistic Skills []  Laryngeal/Pharyngeal Exercises   []  Vocal Loudness []  Reading Comprehension [x]  Swallowing Skills    []  Vocal Cord Function []  Auditory Comprehension []  Oral Motor Skills   []  Resonance []  Writing Skills []  Compensatory strategies    []  Speech Intelligibility []  Expressive Language []  Attention   []  Breath Support/Coord. []  Receptive language []  Memory   []  Articulation []  Safety Awareness []    []  Fluency []  Word Retrieval []        Treatment Provided:  -Patient education    Patient/Caregiver  Education: [x] Review HEP      HEP/Handouts given: NA    Pain Level (0-10 scale) post treatment: 0    ASSESSMENT     [x]   Improving appropriately and progressing toward goals  []   Improving slowly and progressing toward goals  []   Approximating goals/maximum potential  []   Continues to benefit from skilled therapy to address remaining functional deficits  []   Not progressing toward goals and plan of care will be adjusted    Patient will continue to benefit from skilled therapy to address remaining functional deficits: dysphagia    Progress towards goals / Updated goals:  1. Tolerate PO trials with 0 s/sx overt distress in 4/5 trials in order to determine least restrictive diet  2. Utilize compensatory swallow strategies/maneuvers (decrease bite/sip, size/rate, alt. liq/sol) with min cues in 4/5 trials in order to increase safety and tolerance of PO

## 2024-04-16 ENCOUNTER — HOSPITAL ENCOUNTER (OUTPATIENT)
Facility: HOSPITAL | Age: 59
Setting detail: RECURRING SERIES
End: 2024-04-16
Payer: MEDICAID

## 2024-04-23 ENCOUNTER — APPOINTMENT (OUTPATIENT)
Facility: HOSPITAL | Age: 59
End: 2024-04-23
Payer: MEDICAID

## 2024-04-24 ENCOUNTER — TELEPHONE (OUTPATIENT)
Facility: HOSPITAL | Age: 59
End: 2024-04-24

## 2024-04-24 NOTE — TELEPHONE ENCOUNTER
Called pt to get back on sched. Pt already had MBS and his Dr said he's doing okay. Pt requests DC, informed of how to restart ST if needed

## 2024-04-30 ENCOUNTER — APPOINTMENT (OUTPATIENT)
Facility: HOSPITAL | Age: 59
End: 2024-04-30
Payer: MEDICAID

## (undated) DEVICE — APPLICATOR MEDICATED 26 CC SOLUTION HI LT ORNG CHLORAPREP

## (undated) DEVICE — SUTURE VCRL + SZ 2-0 L27IN ABSRB UD CT-2 L26MM 1/2 CIR TAPR VCP269H

## (undated) DEVICE — 3M™ TEGADERM™ TRANSPARENT FILM DRESSING FRAME STYLE, 1626W, 4 IN X 4-3/4 IN (10 CM X 12 CM), 50/CT 4CT/CASE: Brand: 3M™ TEGADERM™

## (undated) DEVICE — DRAIN SURG W7MMXL20CM SIL FULL PERF HUBLESS FLAT RADPQ STRP

## (undated) DEVICE — 3.0MM PRECISION NEURO (MATCH HEAD)

## (undated) DEVICE — SURGIFOAM SPNG SZ 100

## (undated) DEVICE — INTENDED FOR TISSUE SEPARATION, AND OTHER PROCEDURES THAT REQUIRE A SHARP SURGICAL BLADE TO PUNCTURE OR CUT.: Brand: BARD-PARKER SAFETY BLADES SIZE 10, STERILE

## (undated) DEVICE — SUTURE MCRYL SZ 3-0 L27IN ABSRB UD PS-2 3/8 CIR REV CUT NDL MCP427H

## (undated) DEVICE — PACK PROCEDURE SURG ANTR CERV DISCECTOMY

## (undated) DEVICE — DRESSING FOAM DISK DIA1IN H 7MM HYDRPHLC CHG IMPREG IN SL

## (undated) DEVICE — WATERPROOF, BACTERIA PROOF DRESSING WITH ABSORBENT SEE THROUGH PAD: Brand: OPSITE POST-OP VISIBLE 15X10CM CTN 20

## (undated) DEVICE — MARKER,SKIN,WI/RULER AND LABELS: Brand: MEDLINE

## (undated) DEVICE — SYRINGE MEDICAL 3ML CLEAR PLASTIC STANDARD NON CONTROL LUERLOCK TIP DISPOSABLE

## (undated) DEVICE — SOLUTION IRRIG 500ML 0.9% SOD CHLO USP POUR PLAS BTL

## (undated) DEVICE — CONTAINER,SPECIMEN,STRL PATH,4.5OZ: Brand: MEDLINE

## (undated) DEVICE — DRESSING FOAM 0.75IN 1.5MM H DISK CHG IMPREG AEGIS

## (undated) DEVICE — COLLAR CERV L H3XL18IN COT M DENS FOAM BRTH ADJ LO-CONTOUR

## (undated) DEVICE — ANTERIOR CERVICAL POSITIONING SYSTEM SINGLE USE KIT BOX OF 5: Brand: BONEFOAM

## (undated) DEVICE — WATERPROOF, BACTERIA PROOF DRESSING WITH ABSORBENT SEE THROUGH PAD: Brand: OPSITE POST-OP VISIBLE 20X10CM CTN 20

## (undated) DEVICE — ELECTRODE PT RET AD L9FT HI MOIST COND ADH HYDRGEL CORDED

## (undated) DEVICE — GLOVE ORANGE PI 8   MSG9080

## (undated) DEVICE — GARMENT,MEDLINE,DVT,INT,CALF,MED, GEN2: Brand: MEDLINE

## (undated) DEVICE — APPLIER CLP L9.375IN APER 2.1MM CLS L3.8MM 20 SM TI CLP

## (undated) DEVICE — ADHESIVE SKIN CLOSURE WND 8.661X1.5 IN 22 CM LIQUIBAND SECUR

## (undated) DEVICE — SHEET,DRAPE,70X100,STERILE: Brand: MEDLINE

## (undated) DEVICE — RESERVOIR,SUCTION,100CC,SILICONE: Brand: MEDLINE

## (undated) DEVICE — GLOVE ORANGE PI 8 1/2   MSG9085

## (undated) DEVICE — DISTRACTION PIN